# Patient Record
Sex: FEMALE | URBAN - METROPOLITAN AREA
[De-identification: names, ages, dates, MRNs, and addresses within clinical notes are randomized per-mention and may not be internally consistent; named-entity substitution may affect disease eponyms.]

---

## 2022-12-01 ENCOUNTER — OFFICE VISIT (OUTPATIENT)
Dept: GASTROENTEROLOGY | Age: 37
End: 2022-12-01
Payer: MEDICAID

## 2022-12-01 VITALS
BODY MASS INDEX: 32.25 KG/M2 | HEART RATE: 89 BPM | SYSTOLIC BLOOD PRESSURE: 118 MMHG | OXYGEN SATURATION: 98 % | DIASTOLIC BLOOD PRESSURE: 74 MMHG | HEIGHT: 63 IN | WEIGHT: 182 LBS

## 2022-12-01 DIAGNOSIS — D64.9 ANEMIA, UNSPECIFIED TYPE: Primary | ICD-10-CM

## 2022-12-01 DIAGNOSIS — D64.9 ANEMIA, UNSPECIFIED TYPE: ICD-10-CM

## 2022-12-01 LAB
ALBUMIN SERPL-MCNC: 4.4 G/DL (ref 3.5–4.6)
ALP BLD-CCNC: 78 U/L (ref 40–130)
ALT SERPL-CCNC: 141 U/L (ref 0–33)
ANION GAP SERPL CALCULATED.3IONS-SCNC: 6 MEQ/L (ref 9–15)
AST SERPL-CCNC: 193 U/L (ref 0–35)
BILIRUB SERPL-MCNC: 0.4 MG/DL (ref 0.2–0.7)
BUN BLDV-MCNC: 8 MG/DL (ref 6–20)
CALCIUM SERPL-MCNC: 9.3 MG/DL (ref 8.5–9.9)
CHLORIDE BLD-SCNC: 107 MEQ/L (ref 95–107)
CO2: 23 MEQ/L (ref 20–31)
CREAT SERPL-MCNC: 0.84 MG/DL (ref 0.5–0.9)
GFR SERPL CREATININE-BSD FRML MDRD: >60 ML/MIN/{1.73_M2}
GLOBULIN: 2.6 G/DL (ref 2.3–3.5)
GLUCOSE BLD-MCNC: 88 MG/DL (ref 70–99)
HCT VFR BLD CALC: 38.7 % (ref 37–47)
HEMOGLOBIN: 12.1 G/DL (ref 12–16)
MCH RBC QN AUTO: 25.2 PG (ref 27–31.3)
MCHC RBC AUTO-ENTMCNC: 31.4 % (ref 33–37)
MCV RBC AUTO: 80.4 FL (ref 79.4–94.8)
PDW BLD-RTO: 15.9 % (ref 11.5–14.5)
PLATELET # BLD: 235 K/UL (ref 130–400)
POTASSIUM SERPL-SCNC: 3.9 MEQ/L (ref 3.4–4.9)
RBC # BLD: 4.81 M/UL (ref 4.2–5.4)
SODIUM BLD-SCNC: 136 MEQ/L (ref 135–144)
TOTAL PROTEIN: 7 G/DL (ref 6.3–8)
WBC # BLD: 4.4 K/UL (ref 4.8–10.8)

## 2022-12-01 PROCEDURE — 99205 OFFICE O/P NEW HI 60 MIN: CPT | Performed by: INTERNAL MEDICINE

## 2022-12-01 RX ORDER — OMEPRAZOLE 40 MG/1
CAPSULE, DELAYED RELEASE ORAL
COMMUNITY
Start: 2022-11-28

## 2022-12-01 RX ORDER — NORGESTIMATE AND ETHINYL ESTRADIOL 7DAYSX3 28
KIT ORAL
COMMUNITY
Start: 2022-10-27

## 2022-12-01 RX ORDER — RAMELTEON 8 MG/1
TABLET ORAL
COMMUNITY
Start: 2022-06-02

## 2022-12-01 RX ORDER — MONTELUKAST SODIUM 10 MG/1
TABLET ORAL
COMMUNITY
Start: 2022-09-12

## 2022-12-01 RX ORDER — LITHIUM CARBONATE 450 MG
900 TABLET, EXTENDED RELEASE ORAL
COMMUNITY
Start: 2020-07-13 | End: 2022-12-01

## 2022-12-01 RX ORDER — LITHIUM CARBONATE 150 MG/1
CAPSULE ORAL
COMMUNITY
Start: 2021-03-19

## 2022-12-01 RX ORDER — OLANZAPINE 2.5 MG/1
TABLET ORAL
COMMUNITY
Start: 2022-11-08

## 2022-12-01 RX ORDER — FAMOTIDINE 10 MG
10 TABLET ORAL
COMMUNITY
Start: 2022-09-29

## 2022-12-01 RX ORDER — LORAZEPAM 0.5 MG/1
TABLET ORAL
COMMUNITY
Start: 2022-11-10

## 2022-12-01 RX ORDER — LORATADINE 10 MG/1
TABLET ORAL
COMMUNITY
Start: 2022-11-15

## 2022-12-01 RX ORDER — FLUTICASONE PROPIONATE 220 UG/1
AEROSOL, METERED RESPIRATORY (INHALATION)
COMMUNITY
Start: 2022-10-12

## 2022-12-01 RX ORDER — LINACLOTIDE 145 UG/1
CAPSULE, GELATIN COATED ORAL
COMMUNITY
Start: 2022-11-09

## 2022-12-01 RX ORDER — QUETIAPINE FUMARATE 25 MG/1
TABLET, FILM COATED ORAL
COMMUNITY
Start: 2022-10-25

## 2022-12-01 ASSESSMENT — ENCOUNTER SYMPTOMS
TROUBLE SWALLOWING: 0
CHEST TIGHTNESS: 0
ABDOMINAL DISTENTION: 0
CONSTIPATION: 1
EYE PAIN: 0
NAUSEA: 0
WHEEZING: 0
DIARRHEA: 1
BLOOD IN STOOL: 0
SHORTNESS OF BREATH: 0
ABDOMINAL PAIN: 1
RECTAL PAIN: 0
PHOTOPHOBIA: 0
EYE REDNESS: 0
COLOR CHANGE: 0
VOICE CHANGE: 0
VOMITING: 0

## 2022-12-01 NOTE — PROGRESS NOTES
Subjective:      Patient ID: Wesly Hay is a 42 y.o. female who presents today for:  Chief Complaint   Patient presents with    Anemia         HPI  This is a very pleasant 15-year-old who came in today for the evaluation management. Patient mentioned that she was diagnosed with irritable bowel syndrome initially she was having constipation and was on Linzess over the last few weeks has been having loose stool and subsequently Linzess was discontinued. She also mentioned that she was diagnosed with ulcer based on clinical findings and was initiated on PPI. Patient does have history of heartburn and acid. She also reports history of eosinophilic esophagitis diagnosed years ago for which she has been on swallowed fluticasone. Last EGD was 2 years prior to current visit. Patient mentioned that she had colonoscopy almost 8 years prior to current visit. Patient mentioned that she is diagnosed with bipolar disorder and she has been on lithium. During routine evaluation by patient's psychologist/psychiatrist, patient had lab work that showed abnormal transaminases and anemia and subsequently referred to us for the evaluation and management. Patient had ultrasound as follow-up for increase of the enzymes that showed steatosis and gallbladder stones cholelithiasis. Patient denies abdominal pain at this time denies postprandial nausea or vomiting. Patient mentioned that symptoms of the abdominal pain were intermittent in nature but not at this time. Patient came in today to establish care and further evaluation management. Denies overt bleeding. No hematemesis, melena hematochezia. No nocturnal progressive abdominal pain. No weight loss reported. No past medical history on file. No past surgical history on file.   Social History     Socioeconomic History    Marital status: Unknown     Spouse name: Not on file    Number of children: Not on file    Years of education: Not on file    Highest education level: Not on file   Occupational History    Not on file   Tobacco Use    Smoking status: Never    Smokeless tobacco: Never   Substance and Sexual Activity    Alcohol use: Not on file    Drug use: Not on file    Sexual activity: Not on file   Other Topics Concern    Not on file   Social History Narrative    Not on file     Social Determinants of Health     Financial Resource Strain: Not on file   Food Insecurity: Not on file   Transportation Needs: Not on file   Physical Activity: Not on file   Stress: Not on file   Social Connections: Not on file   Intimate Partner Violence: Not on file   Housing Stability: Not on file     No family history on file. Allergies   Allergen Reactions    Penicillins          Review of Systems   Constitutional:  Negative for appetite change, chills, fatigue, fever and unexpected weight change. HENT:  Negative for nosebleeds, tinnitus, trouble swallowing and voice change. Eyes:  Negative for photophobia, pain and redness. Respiratory:  Negative for chest tightness, shortness of breath and wheezing. Cardiovascular:  Negative for chest pain, palpitations and leg swelling. Gastrointestinal:  Positive for abdominal pain, constipation and diarrhea. Negative for abdominal distention, blood in stool, nausea, rectal pain and vomiting. Endocrine: Negative for polydipsia, polyphagia and polyuria. Genitourinary:  Negative for difficulty urinating and hematuria. Skin:  Negative for color change, pallor and rash. Neurological:  Negative for dizziness, speech difficulty and headaches. Psychiatric/Behavioral:  Negative for confusion and suicidal ideas. Objective:   /74 (Site: Right Upper Arm, Position: Sitting, Cuff Size: Small Adult)   Pulse 89   Ht 5' 3\" (1.6 m)   Wt 182 lb (82.6 kg)   SpO2 98%   BMI 32.24 kg/m²     Physical Exam  Constitutional:       General: She is not in acute distress. Appearance: She is well-developed.    HENT:      Head: Normocephalic and atraumatic. Eyes:      Conjunctiva/sclera: Conjunctivae normal.      Pupils: Pupils are equal, round, and reactive to light. Cardiovascular:      Rate and Rhythm: Normal rate and regular rhythm. Heart sounds: Normal heart sounds. Pulmonary:      Effort: Pulmonary effort is normal. No respiratory distress. Breath sounds: Normal breath sounds. No wheezing or rales. Abdominal:      General: Bowel sounds are normal. There is no distension. Palpations: Abdomen is soft. Abdomen is not rigid. There is no hepatomegaly, splenomegaly or mass. Tenderness: There is no abdominal tenderness. There is no guarding or rebound. Musculoskeletal:         General: No tenderness or deformity. Normal range of motion. Cervical back: Neck supple. Skin:     Coloration: Skin is not pale. Findings: No erythema or rash. Neurological:      Mental Status: She is alert and oriented to person, place, and time. Laboratory, Pathology, Radiology reviewed in detail with relevantimportant investigations summarized below:  No results found for: WBC, HGB, HCT, MCV, PLT . No results found for: ALT, AST, GGT, ALKPHOS, BILITOT    No results found. No results found for: IRON, TIBC, FERRITIN  No results found for: INR  No components found for: ACUTEHEPATITISSCREEN  No components found for: CELIACPANEL  No components found for: STOOLCULTURE, C.DIFF, STOOLOVAPARASITE, STOOLLEUCOCYTE        Assessment:    Assessment and plan:    1. Anemia:  Patient established at [facility]. Had blood work and was found to be anemic  Obtain record from outside hospital for further evaluation  No overt bleeding in term of hematemesis, melena or hematochezia  Will obtain CBC for review  Also was told that she may have also and was initiated on PPI. Also carries diagnosis of IBS initially with constipation currently with diarrhea.   Patient discontinued Linzess  Proceed with EGD and colonoscopy further evaluation and management. Explained both procedure risk and benefit. Patient would like to proceed accordingly  EGD with esophageal, gastric and duodenal biopsies  2-History of eosinophilic esophagitis:  Patient mentioned that she was diagnosed eosinophilic esophagitis years ago and she is currently on swallowed fluticasone  Denies food impaction though reports intermittent dysphagia  We will proceed with EGD with biopsies plus minus dilation pending findings  3-Abnormal LFTs/abnormal imaging  Patient mentioned that she had liver function test as part of monitoring lithium therapy for bipolar disorder. Patient was found to have abnormal enzymes, had ultrasound that showed fatty liver and gallbladder stones. Patient currently asymptomatic. Will obtain and reviewed recent liver ultrasound  Obtain liver function tests  4-Associated medical conditions: GERD, IBS, bipolar disorder/anxiety, back pain. ... Return in about 6 weeks (around 1/12/2023) for Post procedure results discussion, further management.       Bianka Erickson MD

## 2023-01-05 ENCOUNTER — ANESTHESIA (OUTPATIENT)
Dept: OPERATING ROOM | Age: 38
End: 2023-01-05
Payer: COMMERCIAL

## 2023-01-05 ENCOUNTER — ANESTHESIA EVENT (OUTPATIENT)
Dept: OPERATING ROOM | Age: 38
End: 2023-01-05
Payer: COMMERCIAL

## 2023-01-05 ENCOUNTER — HOSPITAL ENCOUNTER (OUTPATIENT)
Age: 38
Setting detail: OUTPATIENT SURGERY
Discharge: HOME | End: 2023-01-05
Attending: INTERNAL MEDICINE | Admitting: INTERNAL MEDICINE
Payer: COMMERCIAL

## 2023-01-05 VITALS
BODY MASS INDEX: 32.78 KG/M2 | TEMPERATURE: 98.8 F | RESPIRATION RATE: 16 BRPM | DIASTOLIC BLOOD PRESSURE: 84 MMHG | HEART RATE: 70 BPM | WEIGHT: 185 LBS | SYSTOLIC BLOOD PRESSURE: 117 MMHG | OXYGEN SATURATION: 98 % | HEIGHT: 63 IN

## 2023-01-05 DIAGNOSIS — D64.9 ANEMIA, UNSPECIFIED TYPE: ICD-10-CM

## 2023-01-05 PROBLEM — K29.70 GASTRITIS WITHOUT BLEEDING: Status: ACTIVE | Noted: 2023-01-05

## 2023-01-05 PROBLEM — K63.5 POLYP OF COLON: Status: ACTIVE | Noted: 2023-01-05

## 2023-01-05 PROCEDURE — 88342 IMHCHEM/IMCYTCHM 1ST ANTB: CPT

## 2023-01-05 PROCEDURE — 7100000011 HC PHASE II RECOVERY - ADDTL 15 MIN: Performed by: INTERNAL MEDICINE

## 2023-01-05 PROCEDURE — 2709999900 HC NON-CHARGEABLE SUPPLY: Performed by: INTERNAL MEDICINE

## 2023-01-05 PROCEDURE — 6360000002 HC RX W HCPCS: Performed by: NURSE ANESTHETIST, CERTIFIED REGISTERED

## 2023-01-05 PROCEDURE — 3700000001 HC ADD 15 MINUTES (ANESTHESIA): Performed by: INTERNAL MEDICINE

## 2023-01-05 PROCEDURE — 88305 TISSUE EXAM BY PATHOLOGIST: CPT

## 2023-01-05 PROCEDURE — 3609027000 HC COLONOSCOPY: Performed by: INTERNAL MEDICINE

## 2023-01-05 PROCEDURE — 3700000000 HC ANESTHESIA ATTENDED CARE: Performed by: INTERNAL MEDICINE

## 2023-01-05 PROCEDURE — 43239 EGD BIOPSY SINGLE/MULTIPLE: CPT | Performed by: INTERNAL MEDICINE

## 2023-01-05 PROCEDURE — 7100000010 HC PHASE II RECOVERY - FIRST 15 MIN: Performed by: INTERNAL MEDICINE

## 2023-01-05 PROCEDURE — 3609017100 HC EGD: Performed by: INTERNAL MEDICINE

## 2023-01-05 PROCEDURE — 2580000003 HC RX 258: Performed by: INTERNAL MEDICINE

## 2023-01-05 PROCEDURE — 6370000000 HC RX 637 (ALT 250 FOR IP): Performed by: INTERNAL MEDICINE

## 2023-01-05 PROCEDURE — 45385 COLONOSCOPY W/LESION REMOVAL: CPT | Performed by: INTERNAL MEDICINE

## 2023-01-05 RX ORDER — NORGESTIMATE AND ETHINYL ESTRADIOL 0.25-0.035
KIT ORAL
COMMUNITY
Start: 2021-03-19

## 2023-01-05 RX ORDER — SODIUM CHLORIDE 0.9 % (FLUSH) 0.9 %
5-40 SYRINGE (ML) INJECTION EVERY 12 HOURS SCHEDULED
Status: DISCONTINUED | OUTPATIENT
Start: 2023-01-05 | End: 2023-01-05 | Stop reason: HOSPADM

## 2023-01-05 RX ORDER — KETOCONAZOLE 20 MG/ML
SHAMPOO TOPICAL
COMMUNITY
Start: 2020-08-12

## 2023-01-05 RX ORDER — SIMETHICONE 20 MG/.3ML
EMULSION ORAL PRN
Status: DISCONTINUED | OUTPATIENT
Start: 2023-01-05 | End: 2023-01-05 | Stop reason: HOSPADM

## 2023-01-05 RX ORDER — SODIUM CHLORIDE 0.9 % (FLUSH) 0.9 %
5-40 SYRINGE (ML) INJECTION PRN
Status: DISCONTINUED | OUTPATIENT
Start: 2023-01-05 | End: 2023-01-05 | Stop reason: HOSPADM

## 2023-01-05 RX ORDER — SODIUM CHLORIDE 9 MG/ML
INJECTION, SOLUTION INTRAVENOUS PRN
Status: DISCONTINUED | OUTPATIENT
Start: 2023-01-05 | End: 2023-01-05 | Stop reason: HOSPADM

## 2023-01-05 RX ORDER — BUPROPION HYDROCHLORIDE 150 MG/1
150 TABLET ORAL
Status: ON HOLD | COMMUNITY
Start: 2020-11-30 | End: 2023-01-05

## 2023-01-05 RX ORDER — SODIUM CHLORIDE, SODIUM LACTATE, POTASSIUM CHLORIDE, CALCIUM CHLORIDE 600; 310; 30; 20 MG/100ML; MG/100ML; MG/100ML; MG/100ML
500 INJECTION, SOLUTION INTRAVENOUS CONTINUOUS
Status: DISCONTINUED | OUTPATIENT
Start: 2023-01-05 | End: 2023-01-05 | Stop reason: HOSPADM

## 2023-01-05 RX ORDER — PROPOFOL 10 MG/ML
INJECTION, EMULSION INTRAVENOUS CONTINUOUS PRN
Status: DISCONTINUED | OUTPATIENT
Start: 2023-01-05 | End: 2023-01-05 | Stop reason: SDUPTHER

## 2023-01-05 RX ORDER — PROPOFOL 10 MG/ML
INJECTION, EMULSION INTRAVENOUS PRN
Status: DISCONTINUED | OUTPATIENT
Start: 2023-01-05 | End: 2023-01-05 | Stop reason: SDUPTHER

## 2023-01-05 RX ADMIN — PROPOFOL 30 MG: 10 INJECTION, EMULSION INTRAVENOUS at 11:15

## 2023-01-05 RX ADMIN — PROPOFOL 70 MG: 10 INJECTION, EMULSION INTRAVENOUS at 11:17

## 2023-01-05 RX ADMIN — PROPOFOL 40 MG: 10 INJECTION, EMULSION INTRAVENOUS at 11:00

## 2023-01-05 RX ADMIN — SODIUM CHLORIDE, POTASSIUM CHLORIDE, SODIUM LACTATE AND CALCIUM CHLORIDE 500 ML: 600; 310; 30; 20 INJECTION, SOLUTION INTRAVENOUS at 10:47

## 2023-01-05 RX ADMIN — PROPOFOL 80 MG: 10 INJECTION, EMULSION INTRAVENOUS at 10:58

## 2023-01-05 RX ADMIN — PROPOFOL 50 MG: 10 INJECTION, EMULSION INTRAVENOUS at 11:04

## 2023-01-05 RX ADMIN — PROPOFOL 50 MG: 10 INJECTION, EMULSION INTRAVENOUS at 11:22

## 2023-01-05 RX ADMIN — PROPOFOL 150 MCG/KG/MIN: 10 INJECTION, EMULSION INTRAVENOUS at 10:59

## 2023-01-05 ASSESSMENT — PAIN - FUNCTIONAL ASSESSMENT: PAIN_FUNCTIONAL_ASSESSMENT: 0-10

## 2023-01-05 ASSESSMENT — PAIN SCALES - GENERAL
PAINLEVEL_OUTOF10: 0

## 2023-01-05 NOTE — ANESTHESIA PRE PROCEDURE
Department of Anesthesiology  Preprocedure Note       Name:  Saud Ramsey   Age:  42 y.o.  :  1985                                          MRN:  [id number]         Date:  2023      Surgeon: Gaviota Wall):  Rhoda Webber MD    Procedure: Procedure(s):  COLONOSCOPY DIAGNOSTIC  EGD ESOPHAGOGASTRODUODENOSCOPY    Medications prior to admission:   Prior to Admission medications    Medication Sig Start Date End Date Taking? Authorizing Provider   ketoconazole (NIZORAL) 2 % shampoo   See Instructions, 1 EA, Refill(s) 5, apply to hair twice weekly for up to 8 weeks.  Use 120 ml for the last 10 days, [facility]/pharmacy #[id number] 79.9, kg, 20 9:25:00 EDT, Weight Dosing 20  Yes Historical Provider, MD   norgestimate-ethinyl estradiol ([address]) 0.25-35 MG-MCG per tablet   Refill(s) 0 3/19/21  Yes Historical Provider, MD   LORazepam (ATIVAN) 0.5 MG tablet TAKE 1 TABLET BY MOUTH TWICE A DAY FOR 30 DAYS 11/10/22   Historical Provider, MD   ramelteon (ROZEREM) 8 MG tablet   Refills(s) 0 22   Historical Provider, MD   omeprazole (PRILOSEC) 40 MG delayed release capsule  22   Historical Provider, MD   QUEtiapine (SEROQUEL) 25 MG tablet TAKE 1 AND 1/2 TABLETS BY MOUTH DAILY AT BEDTIME 10/25/22   Historical Provider, MD   OLANZapine (ZYPREXA) 2.5 MG tablet TAKE 1 TABLET BY MOUTH EVERY DAY AT BEDTIME FOR 30 DAYS 22   Historical Provider, MD   Norgestim-Eth Estrad Triphasic 0.18/0.215/0.25 MG-35 MCG TABS TAKE 1 TABLET BY MOUTH EVERY DAY 10/27/22   Historical Provider, MD   loratadine (CLARITIN) 10 MG tablet TAKE 1 TABLET BY MOUTH EVERY DAY 11/15/22   Historical Provider, MD   lithium 150 MG capsule   1 tab po daily in addition to 900mg dose, Refills(s) 0 3/19/21   Historical Provider, MD   LINZESS 145 MCG capsule TAKE 1 CAPSULE BY MOUTH EVERY DAY 22   Historical Provider, MD   famotidine (PEPCID) 10 MG tablet Take 10 mg by mouth 22   Historical Provider, MD   FLOVENT  MCG/ACT inhaler INHALE 2 PUFFS INTO THE LUNGS TWICE A DAY FOR 30 DAYS 10/12/22   Historical Provider, MD   montelukast (SINGULAIR) 10 MG tablet TAKE 1 TABLET BY MOUTH EVERY DAY FOR 90 DAYS 9/12/22   Historical Provider, MD       Current medications:    Current Facility-Administered Medications   Medication Dose Route Frequency Provider Last Rate Last Admin    lactated ringers infusion 500 mL  500 mL IntraVENous Continuous Ricardo Manzano MD           Allergies: Allergies   Allergen Reactions    Penicillins     Lamotrigine Rash     Other reaction(s): Unknown       Problem List:  There is no problem list on file for this patient. Past Medical History:  No past medical history on file. Past Surgical History:  No past surgical history on file. Social History:    Social History     Tobacco Use    Smoking status: Never    Smokeless tobacco: Never   Substance Use Topics    Alcohol use: Not on file                                Counseling given: Not Answered      Vital Signs (Current): There were no vitals filed for this visit.                                            BP Readings from Last 3 Encounters:   12/01/22 118/74       NPO Status:                                                                                 BMI:   Wt Readings from Last 3 Encounters:   12/01/22 182 lb (82.6 kg)     There is no height or weight on file to calculate BMI.    CBC:   Lab Results   Component Value Date/Time    WBC 4.4 12/01/2022 02:13 PM    RBC 4.81 12/01/2022 02:13 PM    HGB 12.1 12/01/2022 02:13 PM    HCT 38.7 12/01/2022 02:13 PM    MCV 80.4 12/01/2022 02:13 PM    RDW 15.9 12/01/2022 02:13 PM     12/01/2022 02:13 PM       CMP:   Lab Results   Component Value Date/Time     12/01/2022 02:13 PM    K 3.9 12/01/2022 02:13 PM     12/01/2022 02:13 PM    CO2 23 12/01/2022 02:13 PM    BUN 8 12/01/2022 02:13 PM    CREATININE 0.84 12/01/2022 02:13 PM    LABGLOM >60.0 12/01/2022 02:13 PM    GLUCOSE 88 12/01/2022 02:13 PM    PROT 7.0 12/01/2022 02:13 PM    CALCIUM 9.3 12/01/2022 02:13 PM    BILITOT 0.4 12/01/2022 02:13 PM    ALKPHOS 78 12/01/2022 02:13 PM     12/01/2022 02:13 PM     12/01/2022 02:13 PM       POC Tests: No results for input(s): POCGLU, POCNA, POCK, POCCL, POCBUN, POCHEMO, POCHCT in the last 72 hours. Coags: No results found for: PROTIME, INR, APTT    HCG (If Applicable): No results found for: PREGTESTUR, PREGSERUM, HCG, HCGQUANT     ABGs: No results found for: PHART, PO2ART, FLS7QFC, NCK3DIO, BEART, S6PEIDPH     Type & Screen (If Applicable):  No results found for: LABABO, LABRH    Drug/Infectious Status (If Applicable):  No results found for: HIV, HEPCAB    COVID-19 Screening (If Applicable): No results found for: COVID19        Anesthesia Evaluation    Airway: Mallampati: II  TM distance: >3 FB   Neck ROM: full  Mouth opening: > = 3 FB   Dental: normal exam         Pulmonary:                              Cardiovascular:    (+) hypertension:,                   Neuro/Psych:               GI/Hepatic/Renal:   (+) liver disease:,           Endo/Other:                     Abdominal:   (+) obese,           Vascular: Other Findings:           Anesthesia Plan      MAC     ASA 2       Induction: intravenous. MIPS: Prophylactic antiemetics administered. Anesthetic plan and risks discussed with patient.                         BISI Chi - CRNA   1/5/2023

## 2023-01-05 NOTE — H&P
Patient Name: Ross Brown  : 1985  MRN: [id number]  DATE: 23      ENDOSCOPY  History and Physical    Procedure:    [x] Diagnostic Colonoscopy       [] Screening Colonoscopy  [x] EGD      [] ERCP      [] EUS       [] Other    [x] Previous office notes/History and Physical reviewed from the patients chart. Please see EMR for further details of HPI. I have examined the patient's status immediately prior to the procedure and:      Indications/HPI:    []Abdominal Pain   []Cancer- GI/Lung  []Fhx of colon CA/polyps  []History of Polyps   []Verdes   []Melena  []Abnormal Imaging   []Dysphagia    []Persistent Pneumonia  [x]Anemia   []Food Impaction  []History of Polyps  []GI Bleed   []Pulmonary nodule/Mass  []Change in bowel habits  []Heartburn/Reflux  []Rectal Bleed (BRBPR)  []Chest Pain - Non Cardiac  []Heme (+) Stool  []Ulcers  []Constipation   []Hemoptysis   []Varices  []Diarrhea   []Hypoxemia  []Nausea/Vomiting   []Screening   []Crohns/Colitis  []Other:    Anesthesia:   [x] MAC [] Moderate Sedation   [] General   [] None     ROS: 12 pt Review of Symptoms was negative unless mentioned above    Medications:   Prior to Admission medications    Medication Sig Start Date End Date Taking? Authorizing Provider   ketoconazole (NIZORAL) 2 % shampoo   See Instructions, 1 EA, Refill(s) 5, apply to hair twice weekly for up to 8 weeks.  Use 120 ml for the last 10 days, [facility]/pharmacy #[id number] 79.9, kg, 20 9:25:00 EDT, Weight Dosing 20  Yes Historical Provider, MD   norgestimate-ethinyl estradiol (ORTHO-CYCLEN) 0.25-35 MG-MCG per tablet   Refill(s) 0 3/19/21  Yes Historical Provider, MD   LORazepam (ATIVAN) 0.5 MG tablet TAKE 1 TABLET BY MOUTH TWICE A DAY FOR 30 DAYS 11/10/22   Historical Provider, MD   ramelteon (ROZEREM) 8 MG tablet   Refills(s) 0 22   Historical Provider, MD   omeprazole (PRILOSEC) 40 MG delayed release capsule  22   Historical Provider, MD   QUEtiapine (SEROQUEL) 25 MG tablet TAKE 1 AND 1/2 TABLETS BY MOUTH DAILY AT BEDTIME 10/25/22   Historical Provider, MD   OLANZapine (ZYPREXA) 2.5 MG tablet TAKE 1 TABLET BY MOUTH EVERY DAY AT BEDTIME FOR 30 DAYS 11/8/22   Historical Provider, MD   loratadine (CLARITIN) 10 MG tablet TAKE 1 TABLET BY MOUTH EVERY DAY 11/15/22   Historical Provider, MD   lithium 150 MG capsule   1 tab po daily in addition to 900mg dose, Refills(s) 0 3/19/21   Historical Provider, MD   LINZESS 145 MCG capsule TAKE 1 CAPSULE BY MOUTH EVERY DAY 11/9/22   Historical Provider, MD   famotidine (PEPCID) 10 MG tablet Take 10 mg by mouth 9/29/22   Historical Provider, MD   FLOVENT  MCG/ACT inhaler INHALE 2 PUFFS INTO THE LUNGS TWICE A DAY FOR 30 DAYS 10/12/22   Historical Provider, MD   montelukast (SINGULAIR) 10 MG tablet TAKE 1 TABLET BY MOUTH EVERY DAY FOR 90 DAYS 9/12/22   Historical Provider, MD       Allergies: Allergies   Allergen Reactions    Penicillins     Lamotrigine Rash     Other reaction(s): Unknown        History of allergic reaction to anesthesia:  No    Past Medical History:  History reviewed. No pertinent past medical history. Past Surgical History:  History reviewed. No pertinent surgical history. Social History:  Social History     Tobacco Use    Smoking status: Never    Smokeless tobacco: Never       Vital Signs:   Vitals:    01/05/23 1036   BP: 114/76   Pulse: 62   Resp: 16   Temp: 98.8 °F (37.1 °C)   SpO2: 98%        Physical Exam:  Cardiac:  [x]WNL  []Comments:  Pulmonary:  [x]WNL   []Comments:   Neuro/Mental Status:  [x]WNL  []Comments:  Abdominal:  [x]WNL    []Comments:  Other:   []WNL  []Comments:    Informed Consent:  The risks and benefits of the procedure have been discussed with either the patient or if they cannot consent, their representative. Assessment:  Patient examined and appropriate for planned sedation and procedure. Plan:  Proceed with planned sedation and procedure as above.     Rei Daigle MD  10:49 AM

## 2023-01-05 NOTE — PROGRESS NOTES
Patient recovering well in PACU.  VSS. Doctor at bedside to review the results of the procedure. Discharge instructions reviewed with patient. All questions answered. IV removed. Patient stable to prepare to DC home.

## 2023-01-05 NOTE — ANESTHESIA POSTPROCEDURE EVALUATION
Department of Anesthesiology  Postprocedure Note    Patient: Flaco Batista  MRN: [id number]  YOB: 1985  Date of evaluation: 1/5/2023      Procedure Summary     Date: 01/05/23 Room / Location: [address] [place] [address]    Anesthesia Start: 1055 Anesthesia Stop: 1133    Procedures:       COLONOSCOPY DIAGNOSTIC      EGD ESOPHAGOGASTRODUODENOSCOPY Diagnosis:       Anemia, unspecified type      (Anemia, unspecified type [D64.9])    Surgeons: Renato Patricia MD Responsible Provider: BISI Snow CRNA    Anesthesia Type: MAC ASA Status: 2          Anesthesia Type: No value filed.     Yahaira Phase I: Yahaira Score: 10    Yahaira Phase II: Yahaira Score: 10      Anesthesia Post Evaluation    Patient location during evaluation: bedside  Patient participation: complete - patient participated  Level of consciousness: awake and awake and alert  Pain score: 0  Airway patency: patent  Nausea & Vomiting: no nausea and no vomiting  Complications: no  Cardiovascular status: blood pressure returned to baseline and hemodynamically stable  Respiratory status: acceptable  Hydration status: euvolemic

## 2023-01-16 ENCOUNTER — OFFICE VISIT (OUTPATIENT)
Dept: GASTROENTEROLOGY | Age: 38
End: 2023-01-16
Payer: COMMERCIAL

## 2023-01-16 VITALS
HEART RATE: 67 BPM | DIASTOLIC BLOOD PRESSURE: 82 MMHG | WEIGHT: 184 LBS | OXYGEN SATURATION: 98 % | SYSTOLIC BLOOD PRESSURE: 116 MMHG | BODY MASS INDEX: 32.59 KG/M2

## 2023-01-16 DIAGNOSIS — R79.89 ABNORMAL LFTS: Primary | ICD-10-CM

## 2023-01-16 PROCEDURE — G8427 DOCREV CUR MEDS BY ELIG CLIN: HCPCS | Performed by: INTERNAL MEDICINE

## 2023-01-16 PROCEDURE — G8417 CALC BMI ABV UP PARAM F/U: HCPCS | Performed by: INTERNAL MEDICINE

## 2023-01-16 PROCEDURE — 99214 OFFICE O/P EST MOD 30 MIN: CPT | Performed by: INTERNAL MEDICINE

## 2023-01-16 PROCEDURE — 1036F TOBACCO NON-USER: CPT | Performed by: INTERNAL MEDICINE

## 2023-01-16 PROCEDURE — G8484 FLU IMMUNIZE NO ADMIN: HCPCS | Performed by: INTERNAL MEDICINE

## 2023-01-16 RX ORDER — NORGESTIMATE AND ETHINYL ESTRADIOL 7DAYSX3 28
KIT ORAL
COMMUNITY
Start: 2023-01-16

## 2023-01-16 RX ORDER — BUDESONIDE 1 MG/2ML
INHALANT ORAL
COMMUNITY

## 2023-01-16 RX ORDER — MINOCYCLINE HYDROCHLORIDE 50 MG/1
1 CAPSULE ORAL
COMMUNITY
Start: 2022-02-09

## 2023-01-16 RX ORDER — DESVENLAFAXINE 50 MG/1
TABLET, EXTENDED RELEASE ORAL
COMMUNITY
Start: 2023-01-12

## 2023-01-16 NOTE — PROGRESS NOTES
Subjective:      Patient ID: Antwan Snigh is a 42 y.o. female who presents today for:  Chief Complaint   Patient presents with    Follow Up After Procedure       HPI  Since last visit, patient had EGD colonoscopy. The colonoscopy showed 3 polyps that were removed and recommended 3 years follow-up. EGD shows chronic changes related to eosinophilic established diagnosis. Biopsies of the esophagus showed normal esophageal mucosa with no eosinophilic esophagitis. Gastric biopsies for H. pylori were negative. Otherwise patient had repeat testing that shows persistently elevation of liver enzymes. Prior note  This is a very pleasant 78-year-old who came in today for the evaluation management. Patient mentioned that she was diagnosed with irritable bowel syndrome initially she was having constipation and was on Linzess over the last few weeks has been having loose stool and subsequently Linzess was discontinued. She also mentioned that she was diagnosed with ulcer based on clinical findings and was initiated on PPI. Patient does have history of heartburn and acid. She also reports history of eosinophilic esophagitis diagnosed years ago for which she has been on swallowed fluticasone. Last EGD was 2 years prior to current visit. Patient mentioned that she had colonoscopy almost 8 years prior to current visit. Patient mentioned that she is diagnosed with bipolar disorder and she has been on lithium. During routine evaluation by patient's psychologist/psychiatrist, patient had lab work that showed abnormal transaminases and anemia and subsequently referred to us for the evaluation and management. Patient had ultrasound as follow-up for increase of the enzymes that showed steatosis and gallbladder stones cholelithiasis. Patient denies abdominal pain at this time denies postprandial nausea or vomiting. Patient mentioned that symptoms of the abdominal pain were intermittent in nature but not at this time. Patient came in today to establish care and further evaluation management. Denies overt bleeding. No hematemesis, melena hematochezia. No nocturnal progressive abdominal pain. No weight loss reported. Colonoscopy  Impression:         -  Three 4 to 8 mm polyps in the sigmoid colon, removed with a cold snare. Resected and retrieved. -  The examined portion of the ileum was normal.         -  External hemorrhoids. Recommendation:         -  Repeat colonoscopy in 3 years for surveillance. -  High fiber diet. -  Continue present medications. -  Await pathology results. -  Return to GI clinic in 2 weeks. EGD  Impression:         -  Esophageal mucosal changes. Biopsied.         -  Esophagogastric landmarks identified.         -  Gastritis. Biopsied.         -  Normal examined duodenum. Biopsied. Recommendation:         -  Await pathology results. -  Return to GI clinic in 2 weeks. -  The patient will be observed post-procedure, until all discharge criteria            are met. -  Patient has a contact number available for emergencies. The signs and            symptoms of potential delayed complications were discussed with the patient. Return to normal activities tomorrow. Written discharge instructions were            provided to the patient. No past medical history on file.   Past Surgical History:   Procedure Laterality Date    COLONOSCOPY N/A 1/5/2023    COLONOSCOPY DIAGNOSTIC performed by Reina Sharp MD at [address] N/A 1/5/2023    EGD ESOPHAGOGASTRODUODENOSCOPY performed by Reina Sharp MD at [address] [place] [address] History     Socioeconomic History    Marital status:      Spouse name: Not on file    Number of children: Not on file    Years of education: Not on file    Highest education level: Not on file   Occupational History    Not on file   Tobacco Use    Smoking status: Never    Smokeless tobacco: Never   Substance and Sexual Activity    Alcohol use: Not on file    Drug use: Not on file    Sexual activity: Not on file   Other Topics Concern    Not on file   Social History Narrative    Not on file     Social Determinants of Health     Financial Resource Strain: Not on file   Food Insecurity: Not on file   Transportation Needs: Not on file   Physical Activity: Not on file   Stress: Not on file   Social Connections: Not on file   Intimate Partner Violence: Not on file   Housing Stability: Not on file     No family history on file. Allergies   Allergen Reactions    Penicillins     Lamotrigine Rash     Other reaction(s): Unknown         Review of Systems   Constitutional:  Negative for appetite change, chills, fatigue, fever and unexpected weight change. HENT:  Negative for nosebleeds, tinnitus, trouble swallowing and voice change. Eyes:  Negative for photophobia, pain and redness. Respiratory:  Negative for chest tightness, shortness of breath and wheezing. Cardiovascular:  Negative for chest pain, palpitations and leg swelling. Gastrointestinal:  Negative for abdominal distention, abdominal pain, blood in stool, constipation, diarrhea, nausea, rectal pain and vomiting. Endocrine: Negative for polydipsia, polyphagia and polyuria. Genitourinary:  Negative for difficulty urinating and hematuria. Skin:  Negative for color change, pallor and rash. Neurological:  Negative for dizziness, speech difficulty and headaches. Psychiatric/Behavioral:  Negative for confusion and suicidal ideas. Objective:   /82 (Site: Left Upper Arm, Position: Sitting, Cuff Size: Small Adult)   Pulse 67   Wt 184 lb (83.5 kg)   SpO2 98%   BMI 32.59 kg/m²     Physical Exam  Constitutional:       General: She is not in acute distress. Appearance: She is well-developed. HENT:      Head: Normocephalic and atraumatic.    Eyes:      Conjunctiva/sclera: Conjunctivae normal. Pupils: Pupils are equal, round, and reactive to light. Cardiovascular:      Rate and Rhythm: Normal rate and regular rhythm. Heart sounds: Normal heart sounds. Pulmonary:      Effort: Pulmonary effort is normal. No respiratory distress. Breath sounds: Normal breath sounds. No wheezing or rales. Abdominal:      General: Bowel sounds are normal. There is no distension. Palpations: Abdomen is soft. Abdomen is not rigid. There is no hepatomegaly, splenomegaly or mass. Tenderness: There is no abdominal tenderness. There is no guarding or rebound. Musculoskeletal:         General: No tenderness or deformity. Normal range of motion. Cervical back: Neck supple. Skin:     Coloration: Skin is not pale. Findings: No erythema or rash. Neurological:      Mental Status: She is alert and oriented to person, place, and time. Laboratory, Pathology, Radiology reviewed in detail with relevantimportant investigations summarized below:  Lab Results   Component Value Date/Time    WBC 4.4 12/01/2022 02:13 PM    HGB 12.1 12/01/2022 02:13 PM    HCT 38.7 12/01/2022 02:13 PM    MCV 80.4 12/01/2022 02:13 PM     12/01/2022 02:13 PM    .  Lab Results   Component Value Date/Time     12/01/2022 02:13 PM     12/01/2022 02:13 PM    ALKPHOS 78 12/01/2022 02:13 PM    BILITOT 0.4 12/01/2022 02:13 PM       EGD    Result Date: 1/5/2023  Site: [facility] Patient Name: Tamiko Salazar Procedure Date: 1/5/2023 MRN: [id number] YOB: 1985 Gender: Female Attending MD: Aniyah Pearl Indications:        -  Anemia Medications:        -  Monitored Anesthesia Care Complications:        -  No immediate complications. Estimated blood loss: None. Estimated Blood Loss:        -  Estimated blood loss: none.  Procedure:        - The Gastroscope was introduced through the mouth and advanced to the second           part of the duodenum.        -  The patient tolerated the procedure well. Findings:        -  Mucosal changes including congestion (edema) were found in the middle third           of the esophagus and in the upper third of the esophagus. Esophageal findings           were graded using the Eosinophilic Esophagitis Endoscopic Reference Score           (EoE-EREFS) as: Edema Grade 1 Present (decreased clarity or absence of vascular           markings) and Rings Grade 1 Mild (subtle circumferential ridges seen on           esophageal distension). Biopsies were taken with a cold forceps for histology. -  Esophagogastric landmarks were identified: the gastroesophageal junction           was found at 38 cm, the lower esophageal sphincter was found at 38 cm and the           upper extent of the gastric folds was found at 38 cm from the incisors. -  The cardia and gastric fundus were normal on retroflexion.        -  Patchy mild inflammation characterized by erythema was found in the gastric           antrum. Biopsies were taken with a cold forceps for histology. -  The examined duodenum was normal.  Biopsies for histology were taken with a           cold forceps for evaluation of celiac disease. Impression:        -  Esophageal mucosal changes. Biopsied.        -  Esophagogastric landmarks identified.        -  Gastritis. Biopsied.        -  Normal examined duodenum. Biopsied. Recommendation:        -  Await pathology results. -  Return to GI clinic in 2 weeks. -  The patient will be observed post-procedure, until all discharge criteria           are met. -  Patient has a contact number available for emergencies. The signs and           symptoms of potential delayed complications were discussed with the patient. Return to normal activities tomorrow. Written discharge instructions were           provided to the patient.  Procedure Code(s):        - [id number], Esophagogastroduodenoscopy, flexible, transoral; with biopsy, single or multiple Diagnosis Code(s):        - D64.9, Anemia, unspecified        - K22.89, Other specified disease of esophagus        - K29.70, Gastritis, unspecified, without bleeding       CPT(R) - 2022 copyright [employer]. All Rights Reserved. The CPT codes, CCI edits and ICD codes generated are intended as suggestions       and were generated based on input data. These codes are preliminary and upon        review may be revised to meet current compliance and payer requirements. The provider is responsible for the final determination of appropriate codes,       and modifiers. Scope Withdrawal Time:       00:00:01 Signature Name: Shania Lilly MD Signature Statement: This document has been electronically signed. Note Initiated On:1/5/2023 Signature Date:1/5/2023 11:31 AM    Colonoscopy    Result Date: 1/5/2023  Site: [facility] Patient Name: Laura Aviles Procedure Date: 1/5/2023 MRN: [id number] YOB: 1985 Gender: Female Attending MD: Shania Lilly Indications:        -  Screening for colorectal malignant neoplasm        -  Anemia Medications:        -  Monitored Anesthesia Care Complications:        -  No immediate complications. Estimated blood loss: None. Estimated Blood Loss:        -  Estimated blood loss: none. Procedure:        - The Colonoscope was introduced through the anus and advanced to the cecum,           identified by appendiceal orifice and ileocecal valve. -  The patient tolerated the procedure well. -  The quality of the bowel preparation was good. -  The ileocecal valve, appendiceal orifice, and rectum were photographed. Findings:        -  Three sessile polyps were found in the sigmoid colon. The polyps were 4 to           8 mm in size. These polyps were removed with a cold snare. Resection and           retrieval were complete.         -  The terminal ileum appeared normal.        -  External hemorrhoids were found during retroflexion. The hemorrhoids were           small. Impression:        -  Three 4 to 8 mm polyps in the sigmoid colon, removed with a cold snare. Resected and retrieved. -  The examined portion of the ileum was normal.        -  External hemorrhoids. Recommendation:        -  Repeat colonoscopy in 3 years for surveillance. -  High fiber diet. -  Continue present medications. -  Await pathology results. -  Return to GI clinic in 2 weeks. -  The patient will be observed post-procedure, until all discharge criteria           are met. -  Patient has a contact number available for emergencies. The signs and           symptoms of potential delayed complications were discussed with the patient. Return to normal activities tomorrow. Written discharge instructions were           provided to the patient. Procedure Code(s):        - [id number], Colonoscopy, flexible; with removal of tumor(s), polyp(s), or other           lesion(s) by snare technique Diagnosis Code(s):        - Z12.11, Encounter for screening for malignant neoplasm of colon        - D64.9, Anemia, unspecified        - D12.5, Benign neoplasm of sigmoid colon        - K64.4, Residual hemorrhoidal skin tags       CPT(R) - 2022 copyright [employer]. All Rights Reserved. The CPT codes, CCI edits and ICD codes generated are intended as suggestions       and were generated based on input data. These codes are preliminary and upon        review may be revised to meet current compliance and payer requirements. The provider is responsible for the final determination of appropriate codes,       and modifiers. Scope Withdrawal Time:       00:09:35 Signature Name: Clayton Friend MD Signature Statement: This document has been electronically signed.  Note Initiated On:1/5/2023 Signature Date:1/5/2023 11:36 AM    No results found for: IRON, TIBC, FERRITIN  No results found for: INR  No components found for: ACUTEHEPATITISSCREEN  No components found for: CELIACPANEL  No components found for: STOOLCULTURE, C.DIFF, STOOLOVAPARASITE, STOOLLEUCOCYTE        Assessment:    1. Status post EGD and colonoscopy:  Had EGD that showed chronic changes related to eosinophilic esophagitis. H. pylori negative gastritis. Duodenal biopsies were negative for celiac  Colonoscopy showed small polyps that were removed and recommended 3 years follow-up  2-History of eosinophilic esophagitis:  Patient mentioned that she was diagnosed eosinophilic esophagitis years ago and she is currently on swallowed fluticasone  Denies food impaction though reports intermittent dysphagia  EGD was negative with no pathological finding esophagus. Continue fluticasone  3-Abnormal LFTs/abnormal imaging  Repeat liver function test obtained with patient elevation of transaminases. Proceed with further assessment to assess for etiology. Obtain FibroScan for further testing  Previous ultrasound showed evidence of steatosis. Ultrasound was at [place]  4- Associated medical conditions: GERD, IBS, bipolar disorder/anxiety, back pain. ... Return in about 6 weeks (around 2/27/2023) for further management.       Carla Butler MD

## 2023-01-18 ASSESSMENT — ENCOUNTER SYMPTOMS
ABDOMINAL DISTENTION: 0
EYE PAIN: 0
TROUBLE SWALLOWING: 0
EYE REDNESS: 0
ABDOMINAL PAIN: 0
WHEEZING: 0
CONSTIPATION: 0
PHOTOPHOBIA: 0
SHORTNESS OF BREATH: 0
BLOOD IN STOOL: 0
CHEST TIGHTNESS: 0
VOMITING: 0
DIARRHEA: 0
VOICE CHANGE: 0
COLOR CHANGE: 0
NAUSEA: 0
RECTAL PAIN: 0

## 2023-01-19 ENCOUNTER — TELEPHONE (OUTPATIENT)
Dept: GASTROENTEROLOGY | Age: 38
End: 2023-01-19

## 2023-01-19 ENCOUNTER — ANCILLARY PROCEDURE (OUTPATIENT)
Dept: ENDOSCOPY | Age: 38
End: 2023-01-19
Payer: COMMERCIAL

## 2023-01-19 DIAGNOSIS — R79.89 ABNORMAL LFTS: ICD-10-CM

## 2023-01-19 LAB
ALBUMIN SERPL-MCNC: 4 G/DL (ref 3.5–4.6)
ALP BLD-CCNC: 79 U/L (ref 40–130)
ALT SERPL-CCNC: 37 U/L (ref 0–33)
ANION GAP SERPL CALCULATED.3IONS-SCNC: 15 MEQ/L (ref 9–15)
AST SERPL-CCNC: 39 U/L (ref 0–35)
BILIRUB SERPL-MCNC: 0.4 MG/DL (ref 0.2–0.7)
BILIRUBIN DIRECT: <0.2 MG/DL (ref 0–0.4)
BILIRUBIN, INDIRECT: NORMAL MG/DL (ref 0–0.6)
BUN BLDV-MCNC: 8 MG/DL (ref 6–20)
CALCIUM SERPL-MCNC: 9 MG/DL (ref 8.5–9.9)
CHLORIDE BLD-SCNC: 106 MEQ/L (ref 95–107)
CO2: 19 MEQ/L (ref 20–31)
CREAT SERPL-MCNC: 0.68 MG/DL (ref 0.5–0.9)
GFR SERPL CREATININE-BSD FRML MDRD: >60 ML/MIN/{1.73_M2}
GLOBULIN: 2.6 G/DL (ref 2.3–3.5)
GLUCOSE BLD-MCNC: 80 MG/DL (ref 70–99)
HCT VFR BLD CALC: 37.5 % (ref 37–47)
HEMOGLOBIN: 12.5 G/DL (ref 12–16)
HEPATITIS B SURFACE ANTIGEN INTERPRETATION: NORMAL
MCH RBC QN AUTO: 26.3 PG (ref 27–31.3)
MCHC RBC AUTO-ENTMCNC: 33.2 % (ref 33–37)
MCV RBC AUTO: 79 FL (ref 79.4–94.8)
PDW BLD-RTO: 16.6 % (ref 11.5–14.5)
PLATELET # BLD: 209 K/UL (ref 130–400)
POTASSIUM SERPL-SCNC: 3.8 MEQ/L (ref 3.4–4.9)
RBC # BLD: 4.75 M/UL (ref 4.2–5.4)
SODIUM BLD-SCNC: 140 MEQ/L (ref 135–144)
TOTAL PROTEIN: 6.6 G/DL (ref 6.3–8)
WBC # BLD: 4.9 K/UL (ref 4.8–10.8)

## 2023-01-19 PROCEDURE — 91200 LIVER ELASTOGRAPHY: CPT

## 2023-01-19 PROCEDURE — 91200 LIVER ELASTOGRAPHY: CPT | Performed by: INTERNAL MEDICINE

## 2023-01-19 NOTE — TELEPHONE ENCOUNTER
The patient was calling because she said that she had a Ultra sound at [place], I placed the report on your ('s) desk. She would like to know if indeed the Ultra sound is still necessary? Please advise,.

## 2023-01-20 LAB
CERULOPLASMIN: 38 MG/DL (ref 16–45)
HAV IGM SER IA-ACNC: NONREACTIVE
HBV SURFACE AB TITR SER: <3.5 MIU/ML
HEPATITIS C ANTIBODY: NONREACTIVE

## 2023-01-21 LAB
FERRITIN: 68 NG/ML (ref 13–150)
IRON SATURATION: 15 % (ref 20–55)
IRON: 66 UG/DL (ref 37–145)
TOTAL IRON BINDING CAPACITY: 429 UG/DL (ref 250–450)
UNSATURATED IRON BINDING CAPACITY: 363 UG/DL (ref 112–347)

## 2023-01-22 LAB
ANTINUCLEAR AB INTERPRETIVE COMMENT: NORMAL
ANTINUCLEAR ANTIBODY, HEP-2, IGG: NORMAL
HAV AB SERPL IA-ACNC: NEGATIVE
IGA: 71 MG/DL (ref 68–408)
IGG: 634 MG/DL (ref 768–1632)
IGM: 41 MG/DL (ref 35–263)
LIVER-KIDNEY MICROSOME-1 AB IGG: 1.9 U (ref 0–24.9)

## 2023-01-23 LAB
ALPHA-1 ANTITRYPSIN PHENOTYPE: NORMAL
ALPHA-1 ANTITRYPSIN: 172 MG/DL (ref 90–200)

## 2023-01-24 LAB — F-ACTIN AB IGA: 4 UNITS (ref 0–24.9)

## 2023-02-02 ENCOUNTER — OFFICE VISIT (OUTPATIENT)
Dept: GASTROENTEROLOGY | Age: 38
End: 2023-02-02
Payer: COMMERCIAL

## 2023-02-02 VITALS
DIASTOLIC BLOOD PRESSURE: 72 MMHG | HEART RATE: 87 BPM | OXYGEN SATURATION: 98 % | SYSTOLIC BLOOD PRESSURE: 114 MMHG | BODY MASS INDEX: 32.95 KG/M2 | WEIGHT: 186 LBS

## 2023-02-02 DIAGNOSIS — R79.89 ABNORMAL LFTS: Primary | ICD-10-CM

## 2023-02-02 PROCEDURE — G8484 FLU IMMUNIZE NO ADMIN: HCPCS | Performed by: INTERNAL MEDICINE

## 2023-02-02 PROCEDURE — 1036F TOBACCO NON-USER: CPT | Performed by: INTERNAL MEDICINE

## 2023-02-02 PROCEDURE — G8417 CALC BMI ABV UP PARAM F/U: HCPCS | Performed by: INTERNAL MEDICINE

## 2023-02-02 PROCEDURE — G8427 DOCREV CUR MEDS BY ELIG CLIN: HCPCS | Performed by: INTERNAL MEDICINE

## 2023-02-02 PROCEDURE — 99214 OFFICE O/P EST MOD 30 MIN: CPT | Performed by: INTERNAL MEDICINE

## 2023-02-02 ASSESSMENT — ENCOUNTER SYMPTOMS
BLOOD IN STOOL: 0
EYE PAIN: 0
PHOTOPHOBIA: 0
EYE REDNESS: 0
VOMITING: 0
ABDOMINAL PAIN: 0
NAUSEA: 0
WHEEZING: 0
CONSTIPATION: 0
SHORTNESS OF BREATH: 0
CHEST TIGHTNESS: 0
ABDOMINAL DISTENTION: 0
VOICE CHANGE: 0
RECTAL PAIN: 0
COLOR CHANGE: 0
DIARRHEA: 0
TROUBLE SWALLOWING: 0

## 2023-02-02 NOTE — PROGRESS NOTES
Subjective:      Patient ID: Rosalee Chinchilla is a 42 y.o. female who presents today for:  Chief Complaint   Patient presents with    Follow-up       HPI  Patient came in today for follow-up visit. Since last visit patient had extensive work-up that ruled out autoimmune, viral etiologies. No abdominal pain reported no nausea or vomiting. Patient came in today to the office. Reports fatigue otherwise no change of condition. Patient came in today for further evaluation management  Prior note  Since last visit, patient had EGD colonoscopy. The colonoscopy showed 3 polyps that were removed and recommended 3 years follow-up. EGD shows chronic changes related to eosinophilic established diagnosis. Biopsies of the esophagus showed normal esophageal mucosa with no eosinophilic esophagitis. Gastric biopsies for H. pylori were negative. Otherwise patient had repeat testing that shows persistently elevation of liver enzymes. Prior note  This is a very pleasant 64-year-old who came in today for the evaluation management. Patient mentioned that she was diagnosed with irritable bowel syndrome initially she was having constipation and was on Linzess over the last few weeks has been having loose stool and subsequently Linzess was discontinued. She also mentioned that she was diagnosed with ulcer based on clinical findings and was initiated on PPI. Patient does have history of heartburn and acid. She also reports history of eosinophilic esophagitis diagnosed years ago for which she has been on swallowed fluticasone. Last EGD was 2 years prior to current visit. Patient mentioned that she had colonoscopy almost 8 years prior to current visit. Patient mentioned that she is diagnosed with bipolar disorder and she has been on lithium.   During routine evaluation by patient's psychologist/psychiatrist, patient had lab work that showed abnormal transaminases and anemia and subsequently referred to us for the evaluation and management. Patient had ultrasound as follow-up for increase of the enzymes that showed steatosis and gallbladder stones cholelithiasis. Patient denies abdominal pain at this time denies postprandial nausea or vomiting. Patient mentioned that symptoms of the abdominal pain were intermittent in nature but not at this time. Patient came in today to establish care and further evaluation management. Denies overt bleeding. No hematemesis, melena hematochezia. No nocturnal progressive abdominal pain. No weight loss reported. Colonoscopy  Impression:         -  Three 4 to 8 mm polyps in the sigmoid colon, removed with a cold snare. Resected and retrieved. -  The examined portion of the ileum was normal.         -  External hemorrhoids. Recommendation:         -  Repeat colonoscopy in 3 years for surveillance. -  High fiber diet. -  Continue present medications. -  Await pathology results. -  Return to GI clinic in 2 weeks. EGD  Impression:         -  Esophageal mucosal changes. Biopsied.         -  Esophagogastric landmarks identified.         -  Gastritis. Biopsied.         -  Normal examined duodenum. Biopsied. Recommendation:         -  Await pathology results. -  Return to GI clinic in 2 weeks. -  The patient will be observed post-procedure, until all discharge criteria            are met. -  Patient has a contact number available for emergencies. The signs and            symptoms of potential delayed complications were discussed with the patient. Return to normal activities tomorrow. Written discharge instructions were            provided to the patient. No past medical history on file.   Past Surgical History:   Procedure Laterality Date    COLONOSCOPY N/A 1/5/2023    COLONOSCOPY DIAGNOSTIC performed by Ivan Barger MD at [address] [place] [address] 1/5/2023    EGD ESOPHAGOGASTRODUODENOSCOPY performed by Qing Nixon MD at [address] [place] [address] History     Socioeconomic History    Marital status:      Spouse name: Not on file    Number of children: Not on file    Years of education: Not on file    Highest education level: Not on file   Occupational History    Not on file   Tobacco Use    Smoking status: Never    Smokeless tobacco: Never   Substance and Sexual Activity    Alcohol use: Not on file    Drug use: Not on file    Sexual activity: Not on file   Other Topics Concern    Not on file   Social History Narrative    Not on file     Social Determinants of Health     Financial Resource Strain: Not on file   Food Insecurity: Not on file   Transportation Needs: Not on file   Physical Activity: Not on file   Stress: Not on file   Social Connections: Not on file   Intimate Partner Violence: Not on file   Housing Stability: Not on file     No family history on file. Allergies   Allergen Reactions    Penicillins     Lamotrigine Rash     Other reaction(s): Unknown         Review of Systems   Constitutional:  Positive for fatigue. Negative for appetite change, chills, fever and unexpected weight change. HENT:  Negative for nosebleeds, tinnitus, trouble swallowing and voice change. Eyes:  Negative for photophobia, pain and redness. Respiratory:  Negative for chest tightness, shortness of breath and wheezing. Cardiovascular:  Negative for chest pain, palpitations and leg swelling. Gastrointestinal:  Negative for abdominal distention, abdominal pain, blood in stool, constipation, diarrhea, nausea, rectal pain and vomiting. Endocrine: Negative for polydipsia, polyphagia and polyuria. Genitourinary:  Negative for difficulty urinating and hematuria. Skin:  Negative for color change, pallor and rash. Neurological:  Negative for dizziness, speech difficulty and headaches. Psychiatric/Behavioral:  Negative for confusion and suicidal ideas.       Objective:   BP 114/72 (Site: Right Upper Arm, Position: Sitting, Cuff Size: Large Adult)   Pulse 87   Wt 186 lb (84.4 kg)   SpO2 98%   BMI 32.95 kg/m²     Physical Exam  Constitutional:       General: She is not in acute distress. Appearance: She is well-developed. HENT:      Head: Normocephalic and atraumatic. Eyes:      Conjunctiva/sclera: Conjunctivae normal.      Pupils: Pupils are equal, round, and reactive to light. Cardiovascular:      Rate and Rhythm: Normal rate and regular rhythm. Heart sounds: Normal heart sounds. Pulmonary:      Effort: Pulmonary effort is normal. No respiratory distress. Breath sounds: Normal breath sounds. No wheezing or rales. Abdominal:      General: Bowel sounds are normal. There is no distension. Palpations: Abdomen is soft. Abdomen is not rigid. There is no hepatomegaly, splenomegaly or mass. Tenderness: There is no abdominal tenderness. There is no guarding or rebound. Musculoskeletal:         General: No tenderness or deformity. Normal range of motion. Cervical back: Neck supple. Skin:     Coloration: Skin is not pale. Findings: No erythema or rash. Neurological:      Mental Status: She is alert and oriented to person, place, and time.        Laboratory, Pathology, Radiology reviewed in detail with relevantimportant investigations summarized below:  Lab Results   Component Value Date/Time    WBC 4.9 01/19/2023 12:54 PM    WBC 4.4 12/01/2022 02:13 PM    HGB 12.5 01/19/2023 12:54 PM    HGB 12.1 12/01/2022 02:13 PM    HCT 37.5 01/19/2023 12:54 PM    HCT 38.7 12/01/2022 02:13 PM    MCV 79.0 01/19/2023 12:54 PM    MCV 80.4 12/01/2022 02:13 PM     01/19/2023 12:54 PM     12/01/2022 02:13 PM    .  Lab Results   Component Value Date/Time    ALT 37 01/19/2023 12:54 PM     12/01/2022 02:13 PM    AST 39 01/19/2023 12:54 PM     12/01/2022 02:13 PM    ALKPHOS 79 01/19/2023 12:54 PM    ALKPHOS 78 12/01/2022 02:13 PM    BILITOT 0.4 2023 12:54 PM    BILITOT 0.4 2022 02:13 PM       US FIBROSCAN    Result Date: 2023  Table formatting from the original result was not included. Velocity Controlled Transient Elastography (Fibroscan)  : Ricky White Attending:  Michelle Pena MD   Patient was identified via name and . Rosalee Burdick presents to endoscopy suite for VCTE. Referring Physician:  Dr. Rock Byers Diagnosis:  Abnormal LFT's   Probe Used:  M   Pre-procedure Checklist:   Presence of ascites:  No Fasting for at least two hours: Yes Alcohol Use:  No History of heart failure:  No     Findings:   Median kPa:  8.4 IQR/med (%):  17   Controlled Attenuation Paramater (CAP) Median (dB/m):  311 IQR (%):  25   Interpretation: Appropriate reading, Based on LSM of 8.4  Kpa,  Fibrosis stage II ( F2) Based on CAP of 311 db/M, moderate to severe steatosis (>33%) - S2-3. Clinical correlation indicated Michelle Pena MD [facility]    EGD    Result Date: 2023  Site: [facility] Patient Name: Micha Aquino Procedure Date: 2023 MRN: [id number] YOB: 1985 Gender: Female Attending MD: Michelle Pena Indications:        -  Anemia Medications:        -  Monitored Anesthesia Care Complications:        -  No immediate complications. Estimated blood loss: None. Estimated Blood Loss:        -  Estimated blood loss: none. Procedure:        - The Gastroscope was introduced through the mouth and advanced to the second           part of the duodenum.        -  The patient tolerated the procedure well. Findings:        -  Mucosal changes including congestion (edema) were found in the middle third           of the esophagus and in the upper third of the esophagus.   Esophageal findings           were graded using the Eosinophilic Esophagitis Endoscopic Reference Score           (EoE-EREFS) as: Edema Grade 1 Present (decreased clarity or absence of vascular           markings) and Rings Grade 1 Mild (subtle circumferential ridges seen on           esophageal distension). Biopsies were taken with a cold forceps for histology. -  Esophagogastric landmarks were identified: the gastroesophageal junction           was found at 38 cm, the lower esophageal sphincter was found at 38 cm and the           upper extent of the gastric folds was found at 38 cm from the incisors. -  The cardia and gastric fundus were normal on retroflexion.        -  Patchy mild inflammation characterized by erythema was found in the gastric           antrum. Biopsies were taken with a cold forceps for histology. -  The examined duodenum was normal.  Biopsies for histology were taken with a           cold forceps for evaluation of celiac disease. Impression:        -  Esophageal mucosal changes. Biopsied.        -  Esophagogastric landmarks identified.        -  Gastritis. Biopsied.        -  Normal examined duodenum. Biopsied. Recommendation:        -  Await pathology results. -  Return to GI clinic in 2 weeks. -  The patient will be observed post-procedure, until all discharge criteria           are met. -  Patient has a contact number available for emergencies. The signs and           symptoms of potential delayed complications were discussed with the patient. Return to normal activities tomorrow. Written discharge instructions were           provided to the patient. Procedure Code(s):        - [id number], Esophagogastroduodenoscopy, flexible, transoral; with biopsy, single           or multiple Diagnosis Code(s):        - D64.9, Anemia, unspecified        - K22.89, Other specified disease of esophagus        - K29.70, Gastritis, unspecified, without bleeding       CPT(R) - 2022 copyright [employer]. All Rights Reserved. The CPT codes, CCI edits and ICD codes generated are intended as suggestions       and were generated based on input data.   These codes are preliminary and upon        review may be revised to meet current compliance and payer requirements.       The provider is responsible for the final determination of appropriate codes,       and modifiers. Scope Withdrawal Time:       00:00:01 Signature Name: Martha Singletary MD Signature Statement:This document has been electronically signed. Note Initiated On:1/5/2023 Signature Date:1/5/2023 11:31 AM    Colonoscopy    Result Date: 1/5/2023  Site: [facility] Patient Name: PARRIS CH Procedure Date: 1/5/2023 MRN: [id number] YOB: 1985 Gender: Female Attending MD: Martha Singletary Indications:        -  Screening for colorectal malignant neoplasm        -  Anemia Medications:        -  Monitored Anesthesia Care Complications:        -  No immediate complications.  Estimated blood loss: None. Estimated Blood Loss:        -  Estimated blood loss: none. Procedure:        - The Colonoscope was introduced through the anus and advanced to the cecum,           identified by appendiceal orifice and ileocecal valve.        -  The patient tolerated the procedure well.        -  The quality of the bowel preparation was good.        -  The ileocecal valve, appendiceal orifice, and rectum were photographed. Findings:        -  Three sessile polyps were found in the sigmoid colon.  The polyps were 4 to           8 mm in size.  These polyps were removed with a cold snare.  Resection and           retrieval were complete.        -  The terminal ileum appeared normal.        -  External hemorrhoids were found during retroflexion.  The hemorrhoids were           small. Impression:        -  Three 4 to 8 mm polyps in the sigmoid colon, removed with a cold snare.           Resected and retrieved.        -  The examined portion of the ileum was normal.        -  External hemorrhoids. Recommendation:        -  Repeat colonoscopy in 3 years for surveillance.        -  High fiber diet.        -  Continue present medications.   -  Await pathology results. -  Return to GI clinic in 2 weeks. -  The patient will be observed post-procedure, until all discharge criteria           are met. -  Patient has a contact number available for emergencies. The signs and           symptoms of potential delayed complications were discussed with the patient. Return to normal activities tomorrow. Written discharge instructions were           provided to the patient. Procedure Code(s):        - [id number], Colonoscopy, flexible; with removal of tumor(s), polyp(s), or other           lesion(s) by snare technique Diagnosis Code(s):        - Z12.11, Encounter for screening for malignant neoplasm of colon        - D64.9, Anemia, unspecified        - D12.5, Benign neoplasm of sigmoid colon        - K64.4, Residual hemorrhoidal skin tags       CPT(R) - 2022 copyright [employer]. All Rights Reserved. The CPT codes, CCI edits and ICD codes generated are intended as suggestions       and were generated based on input data. These codes are preliminary and upon        review may be revised to meet current compliance and payer requirements. The provider is responsible for the final determination of appropriate codes,       and modifiers. Scope Withdrawal Time:       00:09:35 Signature Name: Vinh Hamilton MD Signature Statement: This document has been electronically signed. Note Initiated On:1/5/2023 Signature Date:1/5/2023 11:36 AM    Lab Results   Component Value Date/Time    IRON 66 01/19/2023 12:54 PM    TIBC 429 01/19/2023 12:54 PM    FERRITIN 68 01/19/2023 12:54 PM     No results found for: INR  No components found for: ACUTEHEPATITISSCREEN  No components found for: CELIACPANEL  No components found for: STOOLCULTURE, C.DIFF, STOOLOVAPARASITE, STOOLLEUCOCYTE        Assessment:    1. Status post EGD and colonoscopy:  Had EGD that showed chronic changes related to eosinophilic esophagitis.   H. pylori negative gastritis. Duodenal biopsies were negative for celiac  Colonoscopy showed small polyps that were removed and recommended 3 years follow-up  2- History of eosinophilic esophagitis:  Patient mentioned that she was diagnosed eosinophilic esophagitis years ago and she is currently on swallowed fluticasone  Denies food impaction though reports intermittent dysphagia  EGD was negative with no pathological finding esophagus. Continue fluticasone  3- Abnormal LFTs/abnormal imaging  Patient had etiology liver work-up that was negative for viral, autoimmune and other metabolic etiologies. FibroScan showed fibrosis result on intermediate range. Noted steatosis grade 2 through 3 based on FibroScan. The elevation liver enzyme could be also related to drug-induced liver injury given the multiple medication use. Noted FibroScan result in the gray area with fibrosis stage II noted. Offered to proceed with liver biopsy for diagnostic and staging purposes. Patient is currently reluctant to proceed liver biopsy though may consider at further date  4- Associated medical conditions: GERD, IBS, bipolar disorder/anxiety, back pain. ... Return in about 2 months (around 4/2/2023) for Post procedure results discussion, further management.       Janine Kevin MD

## 2023-03-16 ENCOUNTER — HOSPITAL ENCOUNTER (OUTPATIENT)
Dept: ULTRASOUND IMAGING | Age: 38
Discharge: HOME | End: 2023-03-18
Payer: COMMERCIAL

## 2023-03-16 DIAGNOSIS — R79.89 ABNORMAL LFTS: ICD-10-CM

## 2023-03-16 PROCEDURE — 76705 ECHO EXAM OF ABDOMEN: CPT

## 2023-03-23 ENCOUNTER — OFFICE VISIT (OUTPATIENT)
Dept: GASTROENTEROLOGY | Age: 38
End: 2023-03-23
Payer: COMMERCIAL

## 2023-03-23 VITALS — BODY MASS INDEX: 32.42 KG/M2 | SYSTOLIC BLOOD PRESSURE: 110 MMHG | DIASTOLIC BLOOD PRESSURE: 70 MMHG | WEIGHT: 183 LBS

## 2023-03-23 DIAGNOSIS — R74.8 ABNORMAL LIVER ENZYMES: Primary | ICD-10-CM

## 2023-03-23 DIAGNOSIS — K76.0 HEPATIC STEATOSIS: ICD-10-CM

## 2023-03-23 PROCEDURE — 99213 OFFICE O/P EST LOW 20 MIN: CPT | Performed by: NURSE PRACTITIONER

## 2023-03-23 PROCEDURE — G8417 CALC BMI ABV UP PARAM F/U: HCPCS | Performed by: NURSE PRACTITIONER

## 2023-03-23 PROCEDURE — G8484 FLU IMMUNIZE NO ADMIN: HCPCS | Performed by: NURSE PRACTITIONER

## 2023-03-23 PROCEDURE — 1036F TOBACCO NON-USER: CPT | Performed by: NURSE PRACTITIONER

## 2023-03-23 PROCEDURE — G8427 DOCREV CUR MEDS BY ELIG CLIN: HCPCS | Performed by: NURSE PRACTITIONER

## 2023-03-23 ASSESSMENT — ENCOUNTER SYMPTOMS
EYE PAIN: 0
ABDOMINAL PAIN: 0
RECTAL PAIN: 0
WHEEZING: 0
VOMITING: 0
COLOR CHANGE: 0
CONSTIPATION: 0
ANAL BLEEDING: 0
VOICE CHANGE: 0
DIARRHEA: 0
SHORTNESS OF BREATH: 0
PHOTOPHOBIA: 0
TROUBLE SWALLOWING: 0
NAUSEA: 0
ABDOMINAL DISTENTION: 0
CHEST TIGHTNESS: 0
BLOOD IN STOOL: 0
EYE REDNESS: 0

## 2023-03-23 NOTE — PROGRESS NOTES
429 01/19/2023 12:54 PM    FERRITIN 68 01/19/2023 12:54 PM     No results found for: INR  No components found for: ACUTEHEPATITISSCREEN  No components found for: CELIACPANEL  No components found for: STOOLCULTURE, C.DIFF, STOOLOVAPARASITE, STOOLLEUCOCYTE    Colonoscopy 1/5/23 Dr Romulo Mcrae  Impression:         -  Three 4 to 8 mm polyps in the sigmoid colon, removed with a cold snare. Resected and retrieved. -  The examined portion of the ileum was normal.         -  External hemorrhoids. Recommendation:         -  Repeat colonoscopy in 3 years for surveillance. -  High fiber diet. -  Continue present medications. -  Await pathology results. -  Return to GI clinic in 2 weeks. EGD 1/5/23 Dr Romulo Mcrae  Impression:         -  Esophageal mucosal changes. Biopsied.         -  Esophagogastric landmarks identified.         -  Gastritis. Biopsied.         -  Normal examined duodenum. Biopsied. Recommendation:         -  Await pathology results. -  Return to GI clinic in 2 weeks. -  The patient will be observed post-procedure, until all discharge criteria            are met. -  Patient has a contact number available for emergencies. The signs and            symptoms of potential delayed complications were discussed with the patient. Return to normal activities tomorrow. Written discharge instructions were            provided to the patient. Bx:  A. DUODENAL BIOPSY -   DUODENAL MUCOSA WITH VARIABLE VILLOUS ABNORMALITY, NEGATIVE FOR CELIAC   DISEASE. B. GASTRIC BIOPSY -   MILD CHRONIC GASTRITIS. NEGATIVE FOR HELICOBACTER PYLORI ORGANISMS ON IMMUNOHISTOCHEMISTRY STAINS   WITH SATISFACTORY CONTROLS. C. ESOPHAGUS BIOPSY -   SQUAMOUS ESOPHAGEAL MUCOSA WITH NO SIGNIFICANT PATHOLOGIC CHANGES. NEGATIVE FOR EOSINOPHILIC ESOPHAGITIS OR GRANGER'S ESOPHAGUS. NO GLANDULAR MUCOSA PRESENT FOR EVALUATION. NEGATIVE FOR DYSPLASIA.

## (undated) DEVICE — TUBE SET 96 MM 64 MM H2O PERISTALTIC STD AUX CHANNEL

## (undated) DEVICE — CONNECTOR TUBE BLU CHAN

## (undated) DEVICE — CATHETER SCLERO L240CM NDL 25GA L4MM SHTH DIA2.3MM CNTRST

## (undated) DEVICE — ADAPTER FLSH PMP FLD MGMT GI IRRIG OFP 2 DISPOSABLE

## (undated) DEVICE — TRAYS TRANSPORT SCOPE OASIS W/LID

## (undated) DEVICE — Device: Brand: ENDO SMARTCAP

## (undated) DEVICE — 4-PORT MANIFOLD: Brand: NEPTUNE 2

## (undated) DEVICE — MEDI-VAC NON-CONDUCTIVE SUCTION TUBING: Brand: CARDINAL HEALTH

## (undated) DEVICE — ENDO CARRY-ON PROCEDURE KIT INCLUDES LUBRICANT, DEFENDO OLYMPUS AIR, WATER, SUCTION, BIOPSY VALVE KIT, ENZYMATIC SPONGE, AND BASIN.: Brand: ENDO CARRY-ON PROCEDURE KIT

## (undated) DEVICE — TUBE ENDOSCP COLON CHANNEL

## (undated) DEVICE — BLOCK BITE PEDIATRIC 14 MM MOUTHPIECE POLYETH ENDO-GUARD LTX 100428] BARD INC]

## (undated) DEVICE — BW-412T DISP COMBO CLEANING BRUSH: Brand: SINGLE USE COMBINATION CLEANING BRUSH